# Patient Record
Sex: FEMALE | Race: WHITE | Employment: OTHER | URBAN - METROPOLITAN AREA
[De-identification: names, ages, dates, MRNs, and addresses within clinical notes are randomized per-mention and may not be internally consistent; named-entity substitution may affect disease eponyms.]

---

## 2023-09-01 ENCOUNTER — HOSPITAL ENCOUNTER (OUTPATIENT)
Age: 68
Setting detail: THERAPIES SERIES
Discharge: HOME | End: 2023-09-01
Payer: MEDICARE

## 2023-09-01 PROCEDURE — 97161 PT EVAL LOW COMPLEX 20 MIN: CPT

## 2023-09-01 NOTE — FLOWSHEET NOTE
Sander Fall Risk Assessment    Patient Name:  Bladimir Shelton  : 1955    Risk Factor Scale  Score   History of Falls [] Yes  [x] No 25  0    Secondary Diagnosis [] Yes  [x] No 15  0    Ambulatory Aid [] Furniture  [] Crutches/cane/walker  [x] None/bedrest/wheelchair/nurse 30  15  0    IV/Heparin Lock [] Yes  [x] No 20  0    Gait/Transferring [] Impaired  [] Weak  [x] Normal/bedrest/immobile 20  10  0    Mental Status [] Forgets limitations  [x] Oriented to own ability 15  0       Total:  0     Based on the Assessment score: check the appropriate box. [x]  No intervention needed   Low =   Score of 0-24    []  Use standard prevention interventions Moderate =  Score of 24-44   [] Give patient handout and discuss fall prevention strategies   [] Establish goal of education for patient/family RE: fall prevention strategies    []  Use high risk prevention interventions High = Score of 45 and higher   [] Give patient handout and discuss fall prevention strategies   [] Establish goal of education for patient/family Re: fall prevention strategies   [] Discuss lifeline / other resources    Electronically signed by:    Clifford Pickett, PT  Date: 2023

## 2023-09-01 NOTE — CONSULTS
Tests: [x] X-Ray: L4-S1 disc degeneration [] MRI:  [] Other:    Medications: [x] Refer to full medical record [] None [x] Other: diclofenac  Allergies:      [] Refer to full medical record  [] None [x] Other: PCN? Function:  Hand Dominance  [] Right  [] Left  Patient lives with:  spouse   In what type of home []  One story   [x] Two story   [] Split level   Number of stairs to enter  3   With handrail on the []  Right to enter   [x] Left to enter   Bathroom has a []  Tub only  [] Tub/shower combo   [] Walk in shower    []  Grab bars   Washing machine is on []  Main level   [] Second level   [x] Basement   Employer    Job Status []  Normal duty   [] Light duty   [] Off due to condition    [x]  Retired   [] Not employed   [] Disability  [] Other:  []  Return to work:    Work activities/duties  Works part-time as clinical instructor       ADL/IADL Previous level of function Current level of function Who currently assists the patient with task   Bathing  [] Independent  [] Assist [x] Independent  [] Assist    Dress/grooming [] Independent  [] Assist [x] Independent  [] Assist    Transfer/mobility [] Independent  [] Assist [x] Independent  [] Assist    Feeding [] Independent  [] Assist [x] Independent  [] Assist    Toileting [] Independent  [] Assist [x] Independent  [] Assist    Driving [] Independent  [] Assist [x] Independent  [] Assist    Housekeeping [] Independent  [] Assist [x] Independent  [] Assist    Grocery shop/meal prep [] Independent  [] Assist [x] Independent  [] Assist      Gait Prior level of function Current level of function    [] Independent  [] Assist [x] Independent  [] Assist   Device: [] Independent [x] Independent    [] Straight Cane [] Quad cane [] Straight Cane [] Quad cane    [] Standard walker [] Rolling walker   [] 4 wheeled walker [] Standard walker [] Rolling walker   [] 4 wheeled walker    [] Wheelchair [] Wheelchair     Pain:  [x] Yes  [] No Location: low back/right thigh  Pain

## 2023-09-05 ENCOUNTER — HOSPITAL ENCOUNTER (OUTPATIENT)
Age: 68
Setting detail: THERAPIES SERIES
Discharge: HOME | End: 2023-09-05
Payer: MEDICARE

## 2023-09-05 PROCEDURE — G0283 ELEC STIM OTHER THAN WOUND: HCPCS

## 2023-09-05 PROCEDURE — 97110 THERAPEUTIC EXERCISES: CPT

## 2023-09-05 NOTE — FLOWSHEET NOTE
[x] [address], [address] [place] [address]    Physical Therapy Daily Treatment Note      Date:  2023  Patient Name:  Sarah Hayward    :  1955  MRN: [id number]  Physician: Jose Ramon Comer MD                            [place] [place]  Medical Diagnosis: Right LBP                      Rehab Codes: M54.59  Onset Date: 3/1/23                 Next 's appt. : 6 months  Visit# / total visits:   Cancels/No Shows: 0/0    Subjective:    Pain:  [] Yes  [x] No Location:  Pain Rating: (0-10 scale) 0/10  Pain altered Tx:  [] No  [] Yes  Action:  Comments:  C/o continued LE pain down the leg especially at night. Tailbone gets sore when sitting and watching TV. Objective:  Modalities:   Precautions:  EExercises:  Exercise Reps/ Time Weight/ Level Comments   Prone lying 3 mins       LYDIA or pillows under chest 2 mins       Press ups *gentle 10x       Belted press ups  2x5       Prone lumbar traction  2'       Lumbar mobs (PA glides)  3'    Gr I/II lumbar             Sidelying nerve root flossing on right  15x                 Ham stretches bilat  3x15\"       Piriformis (if no sciatic symptoms)                                       Standing trunk extension 10x             Heat/TENS 4 pads S.I. prone 15'  Back setting   Other:    Specific Instructions for next treatment:  Monitor effect of today's visit and home program compliance. Advance program as able      Assessment: [] Progressing toward goals. [x] No change. No goals met yet. Did very well with all aspects of treatment today. Good response to manual treatment and modalities. No LE symptoms during or after visit.        [] Other:    [] Patient would continue to benefit from skilled physical therapy services in order to: Reduce pain, improve ROM/posture, and return to normal function including lifting, walking, sitting, standing, sleeping, traveling with minimal

## 2023-09-08 ENCOUNTER — HOSPITAL ENCOUNTER (OUTPATIENT)
Age: 68
Setting detail: THERAPIES SERIES
Discharge: HOME | End: 2023-09-08
Payer: MEDICARE

## 2023-09-08 PROCEDURE — G0283 ELEC STIM OTHER THAN WOUND: HCPCS

## 2023-09-08 PROCEDURE — 97110 THERAPEUTIC EXERCISES: CPT

## 2023-09-08 NOTE — FLOWSHEET NOTE
[x] [address]  532 [facility], [address] [place] [address]    Physical Therapy Daily Treatment Note      Date:  2023  Patient Name:  Latrell Diehl    :  1955  MRN: [id number]  Physician: Chavo Wilhelm MD                            Insurance: /[facility] [place]  Medical Diagnosis: Right LBP                      Rehab Codes: M54.59  Onset Date: 3/1/23                 Next 's appt. : 6 months  Visit# / total visits:   Cancels/No Shows: 0/0    Subjective:    Pain:  [] Yes  [x] No Location:  Pain Rating: (0-10 scale) 0/10  Pain altered Tx:  [] No  [] Yes  Action:  Comments:  Continued \"sciatic pain\" at night- able to sleep ~ 4 hours then unable to get comfortable after. States she had a bout of pain running into lateral leg during grocery shopping after last visit, but that resolved fairly quickly. Compliant with HEP at least 2x/day. Objective:  Modalities: TENS/Heat  Precautions:  EExercises:  Exercise Reps/ Time Weight/ Level Comments   Prone lying 3 mins       LYDIA or pillows under chest 2 mins       Press ups *gentle 10x       Belted press ups  2x5       Prone lumbar traction  2'       Lumbar mobs (PA glides)  3'    Gr I/II lumbar             Sidelying nerve root flossing on right  15x    with assist             Ham stretches bilat  3x15\"       Piriformis (if no sciatic symptoms)                                       Standing trunk extension 10x             Heat/TENS 4 pads S.I. prone 15'  continuous setting rather than Low back setting today   Other:    Specific Instructions for next treatment:  Monitor effect of today's visit and home program compliance. Advance program as able      Assessment: [] Progressing toward goals. [x] No change. No goals met yet. Did very well with all aspects of treatment today. Good response to manual treatment and modalities. No LE symptoms during or after visit.        [] Other:    [x] Patient would

## 2023-09-12 ENCOUNTER — HOSPITAL ENCOUNTER (OUTPATIENT)
Age: 68
Setting detail: THERAPIES SERIES
Discharge: HOME | End: 2023-09-12
Payer: MEDICARE

## 2023-09-12 PROCEDURE — 97110 THERAPEUTIC EXERCISES: CPT

## 2023-09-12 PROCEDURE — G0283 ELEC STIM OTHER THAN WOUND: HCPCS

## 2023-09-12 NOTE — FLOWSHEET NOTE
pain and monitor response. No LE symptoms during or after visit. [] Other:    [x] Patient would continue to benefit from skilled physical therapy services in order to: Reduce pain, improve ROM/posture, and return to normal function including lifting, walking, sitting, standing, sleeping, traveling with minimal pain/difficulty    STG: (to be met in 15 treatments)  ? Pain: to 0-2/10 centralized to low back only to improve sleep. ? ROM: Trunk to WNLs to improve sitting. ? Strength:  ? Function: Oswestry to 5/50. Patient to be independent with home exercise program as demonstrated by performance with correct form without cues. LTG: (to be met in 30 treatments)  Patient will return to normal function including lifting, walking, sitting, standing, sleeping, traveling with minimal pain/difficulty. Pt. Education:  [x] Yes  [] No  [x] Reviewed Prior HEP/Ed  Method of Education: [x] Verbal  [x] Demo  [] Written  Comprehension of Education:  [x] Verbalizes understanding. [] Demonstrates understanding. [x] Needs review. [] Demonstrates/verbalizes HEP/Ed previously given. Added ham stretches and nerve glides today. Plan: [x] Continue per plan of care. [] Other:      Treatment Charges: Mins Units   [x]  Modalities: TENS 15 1   [x]  Ther Exercise 25 2   [x]  Manual Therapy 5 0   []  Ther Activities     []  Aquatics     []  Neuromuscular     [] Vasocompression     [] Gait Training     [] Dry needling        [] 1 or 2 muscles        [] 3 or more muscles     []  Other     Total Treatment time 45 3     Time In: 11:46           Time Out: 12:36    Electronically signed by:   Abdullahi Multani PT

## 2023-09-14 ENCOUNTER — HOSPITAL ENCOUNTER (OUTPATIENT)
Age: 68
Setting detail: THERAPIES SERIES
Discharge: HOME | End: 2023-09-14
Payer: MEDICARE

## 2023-09-14 PROCEDURE — G0283 ELEC STIM OTHER THAN WOUND: HCPCS

## 2023-09-14 PROCEDURE — 97110 THERAPEUTIC EXERCISES: CPT

## 2023-09-14 NOTE — FLOWSHEET NOTE
toward goals. [x] No change. No goals met yet. Per subjective report pain intensity and frequency has increased. Tender over lumbar spine during mobs, although reports it's a \"good pain\". No changes in program at this point. She did note she felt pretty good after session. Educated on centralization of pain and possibility new ex's increased intensity for a bit, but also discussed if pain doesn't improve within reasonable amount of time- we will refer back to physician (patient states she was told to complete ~12 visits). [] Other:    [x] Patient would continue to benefit from skilled physical therapy services in order to: Reduce pain, improve ROM/posture, and return to normal function including lifting, walking, sitting, standing, sleeping, traveling with minimal pain/difficulty    STG: (to be met in 15 treatments)  ? Pain: to 0-2/10 centralized to low back only to improve sleep. ? ROM: Trunk to WNLs to improve sitting. ? Strength:  ? Function: Oswestry to 5/50. Patient to be independent with home exercise program as demonstrated by performance with correct form without cues. LTG: (to be met in 30 treatments)  Patient will return to normal function including lifting, walking, sitting, standing, sleeping, traveling with minimal pain/difficulty. Pt. Education:  [x] Yes  [] No  [x] Reviewed Prior HEP/Ed  Method of Education: [x] Verbal  [x] Demo  [] Written  Comprehension of Education:  [x] Verbalizes understanding. [] Demonstrates understanding. [x] Needs review. [] Demonstrates/verbalizes HEP/Ed previously given. Added ham stretches and nerve glides today. 9/14/23  suggested to patient to contact MD sooner than 6 months (next MD appt ) if no improvement in pain. Patient was told to complete 12 visits to see if pain diminishes  Plan: [x] Continue per plan of care.    [] Other:      Treatment Charges: Mins Units   [x]  Modalities: TENS 15 1   [x]  Ther Exercise 25 2   [x]  Manual Therapy

## 2023-09-19 ENCOUNTER — HOSPITAL ENCOUNTER (OUTPATIENT)
Age: 68
Setting detail: THERAPIES SERIES
Discharge: HOME | End: 2023-09-19
Payer: MEDICARE

## 2023-09-19 PROCEDURE — G0283 ELEC STIM OTHER THAN WOUND: HCPCS

## 2023-09-19 PROCEDURE — 97110 THERAPEUTIC EXERCISES: CPT

## 2023-09-19 NOTE — FLOWSHEET NOTE
[x] [address], [address] [place] [address]    Physical Therapy Daily Treatment Note      Date:  2023  Patient Name:  Arlen Parker    :  1955  MRN: [id number]  Physician: Geanie Soulier, MD                            Insurance: /[facility] [place]  Medical Diagnosis: Right LBP                      Rehab Codes: M54.59  Onset Date: 3/1/23                 Next 's appt. : 6 months  Visit# / total visits:   Cancels/No Shows: 0/0    Subjective:    Pain:  [] Yes  [x] No Location: LB only now  Pain Rating: (0-10 scale) 2/10  Pain altered Tx:  [] No  [x] Yes  Action: no new ex's added   Comments:  3-4/10 at worst in the past few days. Much less leg pain than last week. Was out of town and did all of the exercises except the ham stretches as she didn't have a strap. Wonders if it was irritating it. Objective:  Modalities: TENS/Heat  Precautions:  Exercises:  Exercise Reps/ Time Weight/ Level Comments   Prone lying 3 mins       Prone with hips shifted to the left 1' NP (no LE symptoms)  Added  for trial at home   LYDIA or pillows under chest 3 mins (LYDIA)       Press ups *gentle 10x       Belted press ups  2x5       Prone lumbar traction  2'       Lumbar mobs (PA glides)  4'    Gr I/II lumbar             Sidelying nerve root flossing on right  15x    with assist             Ham stretches bilat  4x15\"    standing today   Piriformis (if no sciatic symptoms)  3x15\"    added                                  Standing trunk extension 10x             Heat/TENS 4 pads S.I. prone 15'  continuous setting rather than Low back setting today   Other:    Specific Instructions for next treatment:  Monitor effect of today's visit and home program compliance. Advance program as able      Assessment: [x] Progressing toward goals. Per subjective report pain intensity and frequency has decreased lately as well as centralization of leg symptoms.

## 2023-09-21 ENCOUNTER — HOSPITAL ENCOUNTER (OUTPATIENT)
Age: 68
Setting detail: THERAPIES SERIES
Discharge: HOME | End: 2023-09-21
Payer: MEDICARE

## 2023-09-21 NOTE — FLOWSHEET NOTE
[x] [facility]) [facility]  [address] [place] [address] Andrea    [place], [address]        Physical Therapy Cancel/No Show note    Date: 2023  Patient: Kamlesh Jackson  : 1955  MRN: [id number]    Visit Count:   Cancels/No Shows to date:     For today's appointment patient:    [x]  Cancelled    [] Rescheduled appointment    [] No-show     Reason given by patient:    [x]  Patient ill    []  Conflicting appointment    [] No transportation      [] Conflict with work    [] No reason given    [] Weather related    [] [id number]    [] Other:      Comments:        [x] Next appointment was confirmed    Electronically signed by: Jamshid Darnell PTA

## 2023-09-25 ENCOUNTER — HOSPITAL ENCOUNTER (OUTPATIENT)
Age: 68
Setting detail: THERAPIES SERIES
Discharge: HOME | End: 2023-09-25
Payer: MEDICARE

## 2023-09-25 PROCEDURE — 97110 THERAPEUTIC EXERCISES: CPT

## 2023-09-25 PROCEDURE — 97140 MANUAL THERAPY 1/> REGIONS: CPT

## 2023-09-25 PROCEDURE — G0283 ELEC STIM OTHER THAN WOUND: HCPCS

## 2023-09-25 NOTE — FLOWSHEET NOTE
[x] [address]  532 [facility], [address] [place], [place] [address]     Physical Therapy Daily Treatment Note      Date:  2023  Patient Name:  Kendra Porter    :  1955  MRN: [id number]  Physician: Tati Gomez MD                            Insurance: /[facility] [place]  Medical Diagnosis: Right LBP                      Rehab Codes: M54.59  Onset Date: 3/1/23                 Next 's appt. : 6 months  Visit# / total visits:   Cancels/No Shows: 0/0    Subjective:    Pain:  [] Yes  [x] No Location: LB only now  Pain Rating: (0-10 scale) 2/10  Pain altered Tx:  [] No  [x] Yes  Action: no new ex's added   Comments:  continues to report decreased LE pain overall but can still get it at times. Objective:  Modalities: TENS/Heat  Precautions:  Exercises:  Exercise Reps/ Time Weight/ Level Comments   Prone lying 3 mins       Prone with hips shifted to the left 1' NP (no LE symptoms)  Added  for trial at home   LYDIA or pillows under chest 3 mins (LYDIA)       Press ups *gentle 10x       Belted press ups  2x5       Prone lumbar traction  2'       Lumbar mobs (PA glides); SI mobs  6    Gr I/II lumbar             Sidelying nerve root flossing on right  15x    with assist             Ham stretches bilat  4x15\"    standing today   Piriformis (if no sciatic symptoms)  4x15\"    added                                  Standing trunk extension 10x             Heat/TENS 4 pads S.I. prone 15'  continuous setting rather than Low back setting today   Other:    Specific Instructions for next treatment:  Monitor effect of today's visit and home program compliance. Advance program as able      Assessment: [x] Progressing toward goals. Continues to report improvement in symptoms overall, but still having them. Added SI mobilizations (tender but tolerable). Some tenderness with lumbar mobs at L3. Some incr back pain also after ham stretches. [] No change.

## 2023-09-28 ENCOUNTER — HOSPITAL ENCOUNTER (OUTPATIENT)
Age: 68
Setting detail: THERAPIES SERIES
Discharge: HOME | End: 2023-09-28
Payer: MEDICARE

## 2023-09-28 PROCEDURE — 97110 THERAPEUTIC EXERCISES: CPT

## 2023-09-28 PROCEDURE — 97140 MANUAL THERAPY 1/> REGIONS: CPT

## 2023-09-28 PROCEDURE — G0283 ELEC STIM OTHER THAN WOUND: HCPCS

## 2023-09-28 NOTE — FLOWSHEET NOTE
[x] [address], [address] [place] [address]    Physical Therapy Daily Treatment Note      Date:  2023  Patient Name:  Ale Marquez    :  1955  MRN: [id number]  Physician: Devon Abernathy MD                            Insurance: /[facility] [place]  Medical Diagnosis: Right LBP                      Rehab Codes: M54.59  Onset Date: 3/1/23                 Next 's appt. : 6 months  Visit# / total visits:   Cancels/No Shows: 0/0    Subjective:    Pain:  [] Yes  [x] No Location: LB only now  Pain Rating: (0-10 scale) 2/10  Pain altered Tx:  [] No  [x] Yes  Action: no new ex's added   Comments:  Patient reports pain seems to come and go- had a couple good days, thought she was doing much better- then past 2 nights have been very painful/ difficult to sleep (day time hasn't been too bad). She reports pain running down right LE still present at night especially. Objective:  Modalities: TENS/Heat  Precautions: none  Exercises:  Exercise Reps/ Time Weight/ Level Comments   Prone lying 3 mins       Prone with hips shifted to the left 1' NP (no LE symptoms)  Added  for trial at home   LYDIA or pillows under chest 3 mins (LYDIA)       Press ups *gentle 10x       Belted press ups  2x5       Prone lumbar traction  2'       Lumbar mobs (PA glides); SI mobs  6    Gr I/II lumbar             Sidelying nerve root flossing on right  15x    with assist             Ham stretches bilat  4x15\"    standing today   Piriformis (if no sciatic symptoms)  4x15\"    added     supine cane flexion          bridge          hip abd/add         Standing trunk extension 10x             Heat/TENS 4 pads S.I. prone 15'  continuous setting rather than Low back setting today   Other:    Specific Instructions for next treatment:  Monitor effect of today's visit and home program compliance. Advance program as able.  May add supine cane flex, hip abd/add, and bridging

## 2023-10-02 ENCOUNTER — HOSPITAL ENCOUNTER (OUTPATIENT)
Age: 68
Setting detail: THERAPIES SERIES
Discharge: HOME | End: 2023-10-02
Payer: MEDICARE

## 2023-10-02 PROCEDURE — 97140 MANUAL THERAPY 1/> REGIONS: CPT

## 2023-10-02 PROCEDURE — G0283 ELEC STIM OTHER THAN WOUND: HCPCS

## 2023-10-02 PROCEDURE — 97110 THERAPEUTIC EXERCISES: CPT

## 2023-10-02 NOTE — FLOWSHEET NOTE
[x] [address], [address] [place] [address]    Physical Therapy Daily Treatment Note      Date:  10/2/2023  Patient Name:  Cheryl Hernandez    :  1955  MRN: [id number]  Physician: Rosalina Quintana MD                            Insurance: /[facility] [place]  Medical Diagnosis: Right LBP                      Rehab Codes: M54.59  Onset Date: 3/1/23                 Next 's appt. : 6 months  Visit# / total visits:   Cancels/No Shows: 0/0    Subjective:    Pain:  [] Yes  [x] No Location: LB only now  Pain Rating: (0-10 scale) 2/10  Pain altered Tx:  [] No  [x] Yes  Action: no new ex's added   Comments:  Had quite a bit of a back \"ache\" after last session but had come in with one at the time. Pain up 3/10 at worst in the past few days. Tossing a lot at night still unable to find a comfortable position. Still getting sciatic symptoms but much less often. Objective:  Modalities: TENS/Heat  Precautions: none  Exercises:  Exercise Reps/ Time Weight/ Level Comments   Prone lying 3 mins       Prone with hips shifted to the left 1' NP (no LE symptoms)  Added  for trial at home   LYDIA or pillows under chest 3 mins (LYDIA)       Press ups *gentle 10x       Belted press ups  2x5       Prone hip ext 5x ea. Added 10/   Prone lumbar traction  2'       Lumbar mobs (PA glides); SI mobs  4'    Gr I/II lumbar             Sidelying nerve root flossing on right  15x    with assist             Ham stretches bilat  4x15\"    standing today   Piriformis (if no sciatic symptoms)  4x15\"    added     supine cane flexion          bridge          hip abd/add         Standing trunk extension 10x             Heat/TENS 4 pads S.I. prone 15'  continuous setting rather than Low back setting today   Other:    Specific Instructions for next treatment:  Monitor effect of today's visit and home program compliance. Advance program as able.  May add supine cane flex, hip

## 2023-10-05 ENCOUNTER — HOSPITAL ENCOUNTER (OUTPATIENT)
Age: 68
Setting detail: THERAPIES SERIES
Discharge: HOME | End: 2023-10-05
Payer: MEDICARE

## 2023-10-05 PROCEDURE — 97110 THERAPEUTIC EXERCISES: CPT

## 2023-10-05 PROCEDURE — G0283 ELEC STIM OTHER THAN WOUND: HCPCS

## 2023-10-05 PROCEDURE — 97140 MANUAL THERAPY 1/> REGIONS: CPT

## 2023-10-05 NOTE — FLOWSHEET NOTE
[x] [address]  532 [facility], [address] [place] [address]    Physical Therapy Daily Treatment Note      Date:  10/5/2023  Patient Name:  Isabel Walker    :  1955  MRN: [id number]  Physician: Presley De La Torre MD                            Insurance: /[facility] [place] [place]  Medical Diagnosis: Right LBP                      Rehab Codes: M54.59  Onset Date: 3/1/23                 Next 's appt. : 6 months  Visit# / total visits: 10/30  Cancels/No Shows: 0/0    Subjective:    Pain:  [] Yes  [x] No Location: LB only now  Pain Rating: (0-10 scale) 2/10  Pain altered Tx:  [] No  [x] Yes  Action: no new ex's added   Comments:  Patient reports her biggest complaint currently is yolanda hip pain (she believes is bursitis) at night trying to sleep. Sciatic pain is no longer bothering her much     Objective:  Modalities: TENS/Heat  Precautions: none  Exercises:  Exercise Reps/ Time Weight/ Level Comments   Prone lying 3 mins       Prone with hips shifted to the left 1' NP (no LE symptoms)  Added  for trial at home   LYDIA or pillows under chest 3 mins (LYDIA)       Press ups *gentle 10x       Belted press ups  2x5       Prone hip ext 5x ea. Added 10/2   Prone lumbar traction  2'       Lumbar mobs (SADE mattson); SI mobs  4'    Gr I/II lumbar             Sidelying nerve root flossing on right  15x    with assist             Ham stretches bilat  4x15\"    standing today   Piriformis (if no sciatic symptoms)  4x15\"    added    Supine marching x10  Added 10/5    supine cane flexion  x15  3# wand      bridge  x10    added 10/5    hip abd/add  x10   green/ ball  added 10/5   Standing trunk extension 10x             Heat/TENS 4 pads S.I. prone 15'  continuous setting rather than Low back setting today   Other:    Specific Instructions for next treatment:  Monitor effect of today's visit/ newly added ex's. Advance core strength as able      Assessment: [x] Progressing toward goals.

## 2023-10-09 ENCOUNTER — HOSPITAL ENCOUNTER (OUTPATIENT)
Age: 68
Setting detail: THERAPIES SERIES
Discharge: HOME | End: 2023-10-09
Payer: MEDICARE

## 2023-10-09 PROCEDURE — 97110 THERAPEUTIC EXERCISES: CPT

## 2023-10-09 PROCEDURE — G0283 ELEC STIM OTHER THAN WOUND: HCPCS

## 2023-10-09 PROCEDURE — 97140 MANUAL THERAPY 1/> REGIONS: CPT

## 2023-10-09 NOTE — PROGRESS NOTES
[x] [address]  532 [facility], [address] [place] [address]    Physical Therapy Daily Treatment Note/ Progress note      Date:  10/9/2023  Patient Name:  Dede Prado    :  1955  MRN: [id number]  Physician: Jadyn Winters MD                            [place] [place]  Medical Diagnosis: Right LBP                      Rehab Codes: M54.59  Onset Date: 3/1/23                 Next 's appt. : 6 months  Visit# / total visits:   Cancels/No Shows: 0/0    Subjective:    Pain:  [] Yes  [x] No Location: LB only now  Pain Rating: (0-10 scale) 2/10  Pain altered Tx:  [] No  [x] Yes  Action: no new ex's added   Comments:  Patient reports she has not had sciatic pain since last session. Vinay hips still uncomfortable at night/ with prolonged sitting. Objective:  Modalities: TENS/Heat  Precautions: none  Exercises:  Exercise Reps/ Time Weight/ Level Comments   Prone lying 3 mins       Prone with hips shifted to the left 1' NP (no LE symptoms)  Added  for trial at home   LYDIA  3 mins       Press ups *gentle 10x       Belted press ups  2x5       Prone hip ext 10x ea. Added 10/2   Prone lumbar traction  2' NP       Lumbar mobs (SADE mattson); SI mobs  4'    Gr I/II lumbar             Sidelying nerve root flossing on right  15x NP    with assist             Ham stretches bilat  4x15\"    standing today   Piriformis (if no sciatic symptoms)  4x15\"    added    Supine marching x10 Added alt UE today Added 10/5    supine cane flexion  x15  3# wand      bridge  x15    added 10/5    hip abd/add  x15  green/ ball  added 10/5   Standing trunk extension 10x             Heat/TENS 4 pads S.I. prone 15'  continuous setting rather than Low back setting today   Other:    Specific Instructions for next treatment: Plan to hold further therapy for a couple weeks while completing PT on her own. Assessment: [] Progressing toward goals. [] No change.          [x]

## 2023-10-10 PROBLEM — R00.0 TACHYCARDIA: Status: ACTIVE | Noted: 2020-11-11

## 2023-10-10 PROBLEM — K90.0 CELIAC DISEASE: Status: ACTIVE | Noted: 2017-03-16

## 2023-10-10 PROBLEM — M19.90 OSTEOARTHRITIS: Status: ACTIVE | Noted: 2023-10-10

## 2023-10-10 PROBLEM — R00.2 PALPITATIONS: Status: ACTIVE | Noted: 2020-11-11

## 2023-10-10 PROBLEM — M51.369 LUMBAR DEGENERATIVE DISC DISEASE: Status: ACTIVE | Noted: 2017-10-30

## 2023-10-10 PROBLEM — M53.3 SACROILIAC PAIN: Status: ACTIVE | Noted: 2017-10-30

## 2023-10-10 PROBLEM — M51.36 LUMBAR DEGENERATIVE DISC DISEASE: Status: ACTIVE | Noted: 2017-10-30

## 2023-10-10 RX ORDER — TIZANIDINE 4 MG/1
TABLET ORAL
COMMUNITY
Start: 2022-06-27 | End: 2023-10-12

## 2023-10-10 RX ORDER — ROSUVASTATIN CALCIUM 10 MG/1
TABLET, COATED ORAL
COMMUNITY

## 2023-10-10 RX ORDER — IBUPROFEN 200 MG
200 TABLET ORAL EVERY 6 HOURS PRN
COMMUNITY
End: 2023-10-12

## 2023-10-10 RX ORDER — CYCLOBENZAPRINE HCL 10 MG
TABLET ORAL
COMMUNITY
Start: 2022-10-28

## 2023-10-10 RX ORDER — ALPRAZOLAM 0.25 MG/1
TABLET ORAL
COMMUNITY
Start: 2022-05-31

## 2023-10-10 RX ORDER — KETOROLAC TROMETHAMINE 10 MG/1
TABLET, FILM COATED ORAL
COMMUNITY
Start: 2022-04-19 | End: 2023-10-12

## 2023-10-10 RX ORDER — DICLOFENAC SODIUM 75 MG/1
TABLET, DELAYED RELEASE ORAL
COMMUNITY
Start: 2022-06-27

## 2023-10-10 RX ORDER — LEVOTHYROXINE SODIUM 0.1 MG/1
TABLET ORAL
COMMUNITY

## 2023-10-10 RX ORDER — ZOLPIDEM TARTRATE 10 MG/1
TABLET ORAL
COMMUNITY
Start: 2023-08-24

## 2023-10-10 RX ORDER — ESTRADIOL 10 UG/1
INSERT VAGINAL
COMMUNITY

## 2023-10-12 ENCOUNTER — OFFICE VISIT (OUTPATIENT)
Age: 68
End: 2023-10-12

## 2023-10-12 VITALS
WEIGHT: 146.8 LBS | BODY MASS INDEX: 26.01 KG/M2 | HEART RATE: 74 BPM | SYSTOLIC BLOOD PRESSURE: 126 MMHG | RESPIRATION RATE: 14 BRPM | DIASTOLIC BLOOD PRESSURE: 80 MMHG | TEMPERATURE: 97.8 F | HEIGHT: 63 IN

## 2023-10-12 DIAGNOSIS — E78.2 MIXED HYPERLIPIDEMIA: Primary | ICD-10-CM

## 2023-10-12 DIAGNOSIS — M79.7 FIBROMYALGIA: ICD-10-CM

## 2023-10-12 DIAGNOSIS — G47.00 INSOMNIA, UNSPECIFIED TYPE: ICD-10-CM

## 2023-10-12 DIAGNOSIS — M15.9 PRIMARY OSTEOARTHRITIS INVOLVING MULTIPLE JOINTS: ICD-10-CM

## 2023-10-12 DIAGNOSIS — E03.9 HYPOTHYROIDISM (ACQUIRED): ICD-10-CM

## 2023-10-12 DIAGNOSIS — K90.0 CELIAC DISEASE: ICD-10-CM

## 2023-10-12 NOTE — PROGRESS NOTES
Lymphocytes Absolute 08/28/2023 1.29     Monocytes Absolute 08/28/2023 0.62     Eosinophils Absolute 08/28/2023 0.10     Basophils Absolute 08/28/2023 0.07     IMMATURE GRANULOCYTES AB* 08/28/2023 0.02         ASSESSMENT/PLAN     1. Mixed hyperlipidemia  Comments:  Continue Crestor. LDL 98 2/23  Orders:  -     CBC with Auto Differential; Future  -     Comprehensive Metabolic Panel; Future  -     Lipid Panel; Future  2. Primary osteoarthritis involving multiple joints  Comments:  stable, careful use of voltaren with food  3. Hypothyroidism (acquired)  Comments:  Continue Levothyroxine. at goal 2/23  Orders:  -     TSH; Future  -     T4, Free; Future  4. Insomnia, unspecified type  Comments:  Continue Ambien. discussed careful use  5. Fibromyalgia  Comments:  [facility]  6. Celiac disease  Comments:  stable on gluten free diet       P20 done 10/22    Return in about 6 months (around 4/12/2024). Disposition and Communication: Radha Houser (scribe), documented on behalf of Raven Almeida D.O.      Electronically signed by Gian Mcmahon DO on 10/12/2023 at 6:52 PM

## 2023-10-13 ENCOUNTER — APPOINTMENT (OUTPATIENT)
Age: 68
End: 2023-10-13
Payer: MEDICARE

## 2024-02-20 DIAGNOSIS — G47.00 INSOMNIA, UNSPECIFIED TYPE: Primary | ICD-10-CM

## 2024-02-20 RX ORDER — ZOLPIDEM TARTRATE 10 MG/1
TABLET ORAL
Qty: 90 TABLET | Refills: 0 | Status: SHIPPED | OUTPATIENT
Start: 2024-02-20 | End: 2024-04-20

## 2024-02-20 NOTE — TELEPHONE ENCOUNTER
April Brooks is calling to request a refill on the following medication(s):    Medication Request:  Requested Prescriptions     Pending Prescriptions Disp Refills    zolpidem (AMBIEN) 10 MG tablet       Si tablet at bedtime Orally DAILY for 90 days       Last Visit Date (If Applicable):  10/12/2023    Next Visit Date:    4/15/2024

## 2024-03-12 ENCOUNTER — TELEPHONE (OUTPATIENT)
Age: 69
End: 2024-03-12

## 2024-03-12 NOTE — TELEPHONE ENCOUNTER
I sent in [employer] form for patients Zolpidem to try and get it approved, form is scanned in to patients media, awaiting response

## 2024-04-03 LAB
ALBUMIN SERPL-MCNC: 3.7 G/DL
ALP BLD-CCNC: 58 U/L
ALT SERPL-CCNC: 30 U/L
ANION GAP SERPL CALCULATED.3IONS-SCNC: NORMAL MMOL/L
AST SERPL-CCNC: 31 U/L
BASOPHILS ABSOLUTE: NORMAL
BASOPHILS RELATIVE PERCENT: NORMAL
BILIRUB SERPL-MCNC: 0.3 MG/DL (ref 0.1–1.4)
BUN BLDV-MCNC: 22 MG/DL
CALCIUM SERPL-MCNC: 9.2 MG/DL
CHLORIDE BLD-SCNC: 106 MMOL/L
CHOLESTEROL, TOTAL: 153 MG/DL
CHOLESTEROL/HDL RATIO: 2.7
CO2: 27 MMOL/L
CREAT SERPL-MCNC: 0.89 MG/DL
EGFR: NORMAL
EOSINOPHILS ABSOLUTE: NORMAL
EOSINOPHILS RELATIVE PERCENT: NORMAL
GLUCOSE BLD-MCNC: 84 MG/DL
HCT VFR BLD CALC: 38.4 % (ref 36–46)
HDLC SERPL-MCNC: 56 MG/DL (ref 35–70)
HEMOGLOBIN: 12.7 G/DL (ref 12–16)
LDL CHOLESTEROL CALCULATED: 81 MG/DL (ref 0–160)
LYMPHOCYTES ABSOLUTE: NORMAL
LYMPHOCYTES RELATIVE PERCENT: NORMAL
MCH RBC QN AUTO: 31.9 PG
MCHC RBC AUTO-ENTMCNC: 33.1 G/DL
MCV RBC AUTO: 96.5 FL
MONOCYTES ABSOLUTE: NORMAL
MONOCYTES RELATIVE PERCENT: NORMAL
NEUTROPHILS ABSOLUTE: NORMAL
NEUTROPHILS RELATIVE PERCENT: NORMAL
NONHDLC SERPL-MCNC: NORMAL MG/DL
PDW BLD-RTO: 49.8 %
PLATELET # BLD: 272 K/ΜL
PMV BLD AUTO: NORMAL FL
POTASSIUM SERPL-SCNC: 4 MMOL/L
RBC # BLD: 3.98 10^6/ΜL
SODIUM BLD-SCNC: 140 MMOL/L
T4 FREE: 1.99
TOTAL PROTEIN: 6.1
TRIGL SERPL-MCNC: 81 MG/DL
TSH SERPL DL<=0.05 MIU/L-ACNC: 0.37 UIU/ML
VLDLC SERPL CALC-MCNC: 16 MG/DL
WBC # BLD: 8.22 10^3/ML

## 2024-04-04 DIAGNOSIS — E78.2 MIXED HYPERLIPIDEMIA: ICD-10-CM

## 2024-04-04 DIAGNOSIS — E03.9 HYPOTHYROIDISM (ACQUIRED): ICD-10-CM

## 2024-04-12 SDOH — ECONOMIC STABILITY: FOOD INSECURITY: WITHIN THE PAST 12 MONTHS, YOU WORRIED THAT YOUR FOOD WOULD RUN OUT BEFORE YOU GOT MONEY TO BUY MORE.: NEVER TRUE

## 2024-04-12 SDOH — ECONOMIC STABILITY: HOUSING INSECURITY
IN THE LAST 12 MONTHS, WAS THERE A TIME WHEN YOU DID NOT HAVE A STEADY PLACE TO SLEEP OR SLEPT IN A SHELTER (INCLUDING NOW)?: NO

## 2024-04-12 SDOH — ECONOMIC STABILITY: FOOD INSECURITY: WITHIN THE PAST 12 MONTHS, THE FOOD YOU BOUGHT JUST DIDN'T LAST AND YOU DIDN'T HAVE MONEY TO GET MORE.: NEVER TRUE

## 2024-04-12 SDOH — ECONOMIC STABILITY: TRANSPORTATION INSECURITY
IN THE PAST 12 MONTHS, HAS LACK OF TRANSPORTATION KEPT YOU FROM MEETINGS, WORK, OR FROM GETTING THINGS NEEDED FOR DAILY LIVING?: NO

## 2024-04-12 SDOH — ECONOMIC STABILITY: INCOME INSECURITY: HOW HARD IS IT FOR YOU TO PAY FOR THE VERY BASICS LIKE FOOD, HOUSING, MEDICAL CARE, AND HEATING?: NOT HARD AT ALL

## 2024-04-15 ENCOUNTER — OFFICE VISIT (OUTPATIENT)
Age: 69
End: 2024-04-15
Payer: MEDICARE

## 2024-04-15 VITALS
SYSTOLIC BLOOD PRESSURE: 128 MMHG | WEIGHT: 147 LBS | RESPIRATION RATE: 12 BRPM | HEART RATE: 62 BPM | OXYGEN SATURATION: 98 % | BODY MASS INDEX: 26.05 KG/M2 | HEIGHT: 63 IN | DIASTOLIC BLOOD PRESSURE: 72 MMHG

## 2024-04-15 DIAGNOSIS — E03.9 HYPOTHYROIDISM (ACQUIRED): ICD-10-CM

## 2024-04-15 DIAGNOSIS — M15.9 PRIMARY OSTEOARTHRITIS INVOLVING MULTIPLE JOINTS: ICD-10-CM

## 2024-04-15 DIAGNOSIS — E78.2 MIXED HYPERLIPIDEMIA: Primary | ICD-10-CM

## 2024-04-15 DIAGNOSIS — G47.00 INSOMNIA, UNSPECIFIED TYPE: ICD-10-CM

## 2024-04-15 DIAGNOSIS — K90.0 CELIAC DISEASE: ICD-10-CM

## 2024-04-15 DIAGNOSIS — Z00.00 MEDICARE ANNUAL WELLNESS VISIT, SUBSEQUENT: ICD-10-CM

## 2024-04-15 PROCEDURE — 1090F PRES/ABSN URINE INCON ASSESS: CPT | Performed by: FAMILY MEDICINE

## 2024-04-15 PROCEDURE — 1036F TOBACCO NON-USER: CPT | Performed by: FAMILY MEDICINE

## 2024-04-15 PROCEDURE — G8400 PT W/DXA NO RESULTS DOC: HCPCS | Performed by: FAMILY MEDICINE

## 2024-04-15 PROCEDURE — 1123F ACP DISCUSS/DSCN MKR DOCD: CPT | Performed by: FAMILY MEDICINE

## 2024-04-15 PROCEDURE — 3017F COLORECTAL CA SCREEN DOC REV: CPT | Performed by: FAMILY MEDICINE

## 2024-04-15 PROCEDURE — G8427 DOCREV CUR MEDS BY ELIG CLIN: HCPCS | Performed by: FAMILY MEDICINE

## 2024-04-15 PROCEDURE — G0439 PPPS, SUBSEQ VISIT: HCPCS | Performed by: FAMILY MEDICINE

## 2024-04-15 PROCEDURE — 99214 OFFICE O/P EST MOD 30 MIN: CPT | Performed by: FAMILY MEDICINE

## 2024-04-15 PROCEDURE — G8419 CALC BMI OUT NRM PARAM NOF/U: HCPCS | Performed by: FAMILY MEDICINE

## 2024-04-15 RX ORDER — TRIAMCINOLONE ACETONIDE 1 MG/G
CREAM TOPICAL
COMMUNITY
Start: 2024-03-15

## 2024-04-15 ASSESSMENT — PATIENT HEALTH QUESTIONNAIRE - PHQ9
SUM OF ALL RESPONSES TO PHQ QUESTIONS 1-9: 0
2. FEELING DOWN, DEPRESSED OR HOPELESS: NOT AT ALL

## 2024-04-15 NOTE — PROGRESS NOTES
for back pain, neck pain and neck stiffness.   Skin:  Negative for rash and wound.   Neurological:  Negative for syncope, weakness, light-headedness and headaches.   Hematological:  Negative for adenopathy. Does not bruise/bleed easily.   Psychiatric/Behavioral:  Negative for suicidal ideas. The patient is not nervous/anxious.      REVIEWED INFORMATION      Allergies   Allergen Reactions    Penicillins Other (See Comments)       Current Outpatient Medications   Medication Sig Dispense Refill    triamcinolone (KENALOG) 0.1 % cream       Diclofenac Sodium (VOLTAREN PO) Take 75 mg by mouth daily      metroNIDAZOLE (METROCREAM) 0.75 % cream Apply topically 2 times daily Apply topically 2 times daily.      zolpidem (AMBIEN) 10 MG tablet 1 tablet at bedtime Orally DAILY for 90 days 90 tablet 0    ALPRAZolam (XANAX) 0.25 MG tablet 1 tablet Orally daily prn for 30 days      vitamin D (CHOLECALCIFEROL) 50 MCG (2000 UT) CAPS capsule Take 1 capsule by mouth daily      cyclobenzaprine (FLEXERIL) 10 MG tablet 1 tablet at bedtime as needed Orally Once a day for 30 days      levothyroxine (SYNTHROID) 100 MCG tablet TAKE ONE TABLET BY MOUTH DAILY for 90      rosuvastatin (CRESTOR) 10 MG tablet TAKE ONE TABLET BY MOUTH DAILY for 90       No current facility-administered medications for this visit.        Patient Active Problem List   Diagnosis    Celiac disease    Lumbar degenerative disc disease    Osteoarthritis    Palpitations    Sacroiliac pain    Tachycardia       Past Medical History:   Diagnosis Date    Arthritis     right hand    Bursitis     Celiac disease     Fractured coccyx (HCC)     2012    Functional murmur     Hearing loss     History of anemia     History of celiac disease     History of Graves' disease     History of mumps     History of urinary calculi     History of varicella     Hypothyroidism     Kidney stone     Rosacea     Tendonitis     Thyroid disorder        Past Surgical History:   Procedure Laterality

## 2024-07-15 DIAGNOSIS — G47.00 INSOMNIA, UNSPECIFIED TYPE: ICD-10-CM

## 2024-07-15 RX ORDER — ZOLPIDEM TARTRATE 10 MG/1
TABLET ORAL
Qty: 90 TABLET | Refills: 0 | Status: SHIPPED | OUTPATIENT
Start: 2024-07-15 | End: 2024-10-15

## 2024-07-15 NOTE — TELEPHONE ENCOUNTER
April Brooks is calling to request a refill on the following medication(s):    Medication Request:  Requested Prescriptions     Pending Prescriptions Disp Refills    zolpidem (AMBIEN) 10 MG tablet [Pharmacy Med Name: ZOLPIDEM TARTRATE 10 MG TABLET] 30 tablet      Sig: TAKE 1 TABLET BY MOUTH EVERY NIGHT AT BEDTIME       Last Visit Date (If Applicable):  4/15/2024    Next Visit Date:    10/21/2024

## 2024-10-14 LAB
BASOPHILS ABSOLUTE: 0.1 /ΜL
BASOPHILS RELATIVE PERCENT: 1.8 %
EOSINOPHILS ABSOLUTE: 0.26 /ΜL
EOSINOPHILS RELATIVE PERCENT: 4.6 %
HCT VFR BLD CALC: 39 % (ref 36–46)
HEMOGLOBIN: 12.9 G/DL (ref 12–16)
LYMPHOCYTES ABSOLUTE: 1.29 /ΜL
LYMPHOCYTES RELATIVE PERCENT: 22.9 %
MCH RBC QN AUTO: 31.9 PG
MCHC RBC AUTO-ENTMCNC: 33.1 G/DL
MCV RBC AUTO: 96.3 FL
MONOCYTES ABSOLUTE: 0.48 /ΜL
MONOCYTES RELATIVE PERCENT: 8.5 %
NEUTROPHILS ABSOLUTE: 3.5 /ΜL
NEUTROPHILS RELATIVE PERCENT: 62 %
PDW BLD-RTO: 48.6 %
PLATELET # BLD: 313 K/ΜL
PMV BLD AUTO: NORMAL FL
RBC # BLD: 4.05 10^6/ΜL
WBC # BLD: 5.64 10^3/ML

## 2024-10-17 DIAGNOSIS — E78.2 MIXED HYPERLIPIDEMIA: ICD-10-CM

## 2024-10-21 ENCOUNTER — HOSPITAL ENCOUNTER (OUTPATIENT)
Age: 69
Setting detail: SPECIMEN
Discharge: HOME | End: 2024-10-21

## 2024-10-21 ENCOUNTER — OFFICE VISIT (OUTPATIENT)
Age: 69
End: 2024-10-21

## 2024-10-21 VITALS
SYSTOLIC BLOOD PRESSURE: 134 MMHG | HEART RATE: 62 BPM | DIASTOLIC BLOOD PRESSURE: 70 MMHG | BODY MASS INDEX: 27.32 KG/M2 | HEIGHT: 63 IN | TEMPERATURE: 97.8 F | OXYGEN SATURATION: 99 % | WEIGHT: 154.2 LBS

## 2024-10-21 DIAGNOSIS — K90.0 CELIAC DISEASE: ICD-10-CM

## 2024-10-21 DIAGNOSIS — M15.0 PRIMARY OSTEOARTHRITIS INVOLVING MULTIPLE JOINTS: ICD-10-CM

## 2024-10-21 DIAGNOSIS — E03.9 HYPOTHYROIDISM (ACQUIRED): ICD-10-CM

## 2024-10-21 DIAGNOSIS — R35.0 URINARY FREQUENCY: ICD-10-CM

## 2024-10-21 DIAGNOSIS — E78.2 MIXED HYPERLIPIDEMIA: ICD-10-CM

## 2024-10-21 DIAGNOSIS — E78.2 MIXED HYPERLIPIDEMIA: Primary | ICD-10-CM

## 2024-10-21 DIAGNOSIS — G47.00 INSOMNIA, UNSPECIFIED TYPE: ICD-10-CM

## 2024-10-21 DIAGNOSIS — M46.1 SACROILIITIS (HCC): ICD-10-CM

## 2024-10-21 DIAGNOSIS — F41.9 ANXIETY: ICD-10-CM

## 2024-10-21 DIAGNOSIS — Z23 IMMUNIZATION DUE: ICD-10-CM

## 2024-10-21 LAB
ALBUMIN SERPL-MCNC: 4.3 G/DL (ref 3.5–5.2)
ALBUMIN/GLOB SERPL: 2 {RATIO} (ref 1–2.5)
ALP SERPL-CCNC: 58 U/L (ref 35–104)
ALT SERPL-CCNC: 18 U/L (ref 10–35)
ANION GAP SERPL CALCULATED.3IONS-SCNC: 9 MMOL/L (ref 9–16)
AST SERPL-CCNC: 25 U/L (ref 10–35)
BILIRUB SERPL-MCNC: 0.4 MG/DL (ref 0–1.2)
BILIRUB UR QL STRIP: NEGATIVE
BUN SERPL-MCNC: 21 MG/DL (ref 8–23)
CALCIUM SERPL-MCNC: 9.9 MG/DL (ref 8.6–10.4)
CHLORIDE SERPL-SCNC: 104 MMOL/L (ref 98–107)
CLARITY UR: CLEAR
CO2 SERPL-SCNC: 26 MMOL/L (ref 20–31)
COLOR UR: YELLOW
COMMENT: NORMAL
CREAT SERPL-MCNC: 0.9 MG/DL (ref 0.5–0.9)
GFR, ESTIMATED: 72 ML/MIN/1.73M2
GLUCOSE SERPL-MCNC: 76 MG/DL (ref 74–99)
GLUCOSE UR STRIP-MCNC: NEGATIVE MG/DL
HGB UR QL STRIP.AUTO: NEGATIVE
KETONES UR STRIP-MCNC: NEGATIVE MG/DL
LEUKOCYTE ESTERASE UR QL STRIP: NEGATIVE
NITRITE UR QL STRIP: NEGATIVE
PH UR STRIP: 5.5 [PH] (ref 5–8)
POTASSIUM SERPL-SCNC: 4.1 MMOL/L (ref 3.7–5.3)
PROT SERPL-MCNC: 6.8 G/DL (ref 6.6–8.7)
PROT UR STRIP-MCNC: NEGATIVE MG/DL
SODIUM SERPL-SCNC: 139 MMOL/L (ref 136–145)
SP GR UR STRIP: 1.01 (ref 1–1.03)
UROBILINOGEN UR STRIP-ACNC: NORMAL EU/DL (ref 0–1)

## 2024-10-21 RX ORDER — ZOLPIDEM TARTRATE 10 MG/1
10 TABLET ORAL NIGHTLY
Qty: 90 TABLET | Refills: 0 | Status: SHIPPED | OUTPATIENT
Start: 2024-10-21 | End: 2025-01-19

## 2024-10-21 RX ORDER — LIDOCAINE 50 MG/G
1 PATCH TOPICAL DAILY
Qty: 30 PATCH | Refills: 0 | Status: SHIPPED | OUTPATIENT
Start: 2024-10-21 | End: 2024-11-20

## 2024-10-21 RX ORDER — BIOTIN 1000 MCG
TABLET,CHEWABLE ORAL
COMMUNITY

## 2024-10-21 RX ORDER — ZOLPIDEM TARTRATE 10 MG/1
TABLET ORAL NIGHTLY
COMMUNITY
End: 2024-10-21 | Stop reason: SDUPTHER

## 2024-10-21 RX ORDER — ALPRAZOLAM 0.25 MG/1
0.25 TABLET ORAL DAILY PRN
Qty: 30 TABLET | Refills: 0 | Status: SHIPPED | OUTPATIENT
Start: 2024-10-21 | End: 2024-11-20

## 2024-10-21 NOTE — PROGRESS NOTES
Vaccine Information Sheet, \"Influenza - Inactivated\"  given to April Brooks, or parent/legal guardian of  April Brooks and verbalized understanding.    Patient responses:    Have you ever had a reaction to a flu vaccine? No  Are you able to eat eggs without adverse effects?  Yes  Do you have any current illness?  No  Have you ever had Guillian [place] Syndrome?  No    Flu vaccine given per order. Please see immunization tab.    Left deltoid.                
SYSTEM      Review of Systems:   Constitutional:  Negative for chills, fatigue, fever and unexpected weight change.   Eyes:  Negative for visual disturbance.   Respiratory:  Negative for cough, chest tightness, shortness of breath and wheezing.    Cardiovascular:  Negative for chest pain, palpitations and leg swelling.   Gastrointestinal:  Negative for abdominal distention, abdominal pain, blood in stool, constipation, diarrhea, nausea and vomiting.   Genitourinary:  Negative for dysuria, hematuria and urgency.   Musculoskeletal:  Negative for back pain, neck pain and neck stiffness.   Skin:  Negative for rash and wound.   Neurological:  Negative for syncope, weakness, light-headedness and headaches.   Hematological:  Negative for adenopathy. Does not bruise/bleed easily.   Psychiatric/Behavioral:  Negative for suicidal ideas. The patient is not nervous/anxious.      REVIEWED INFORMATION      Allergies   Allergen Reactions    Penicillins Other (See Comments)       Current Outpatient Medications   Medication Sig Dispense Refill    Biotin 1000 MCG CHEW Take by mouth Unsure of dose      zolpidem (AMBIEN) 10 MG tablet Take 1 tablet by mouth at bedtime for 90 days. Max Daily Amount: 10 mg 90 tablet 0    ALPRAZolam (XANAX) 0.25 MG tablet Take 1 tablet by mouth daily as needed for Anxiety for up to 30 days. Max Daily Amount: 0.25 mg 30 tablet 0    lidocaine (LIDODERM) 5 % Place 1 patch onto the skin daily 12 hours on, 12 hours off. 30 patch 0    triamcinolone (KENALOG) 0.1 % cream       Diclofenac Sodium (VOLTAREN PO) Take 75 mg by mouth daily      metroNIDAZOLE (METROCREAM) 0.75 % cream Apply topically 2 times daily Apply topically 2 times daily.      vitamin D (CHOLECALCIFEROL) 50 MCG (2000 UT) CAPS capsule Take 1 capsule by mouth 2 times daily      cyclobenzaprine (FLEXERIL) 10 MG tablet 1 tablet at bedtime as needed Orally Once a day for 30 days      levothyroxine (SYNTHROID) 100 MCG tablet TAKE ONE TABLET BY MOUTH

## 2024-10-23 LAB
MICROORGANISM SPEC CULT: ABNORMAL
SERVICE CMNT-IMP: ABNORMAL
SPECIMEN DESCRIPTION: ABNORMAL

## 2024-10-23 RX ORDER — CEPHALEXIN 500 MG/1
500 CAPSULE ORAL 3 TIMES DAILY
Qty: 21 CAPSULE | Refills: 0 | Status: SHIPPED | OUTPATIENT
Start: 2024-10-23 | End: 2024-10-30

## 2024-11-21 RX ORDER — LEVOTHYROXINE SODIUM 100 UG/1
100 TABLET ORAL DAILY
Qty: 90 TABLET | Refills: 1 | Status: SHIPPED | OUTPATIENT
Start: 2024-11-21

## 2024-11-21 NOTE — TELEPHONE ENCOUNTER
April Brooks is calling to request a refill on the following medication(s):    Medication Request:  Requested Prescriptions     Pending Prescriptions Disp Refills    levothyroxine (SYNTHROID) 100 MCG tablet [Pharmacy Med Name: LEVOTHYROXINE 100 MCG TABLET] 90 tablet      Sig: TAKE 1 TABLET BY MOUTH DAILY       Last Visit Date (If Applicable):  10/21/2024    Next Visit Date:    4/21/2025

## 2024-12-17 ENCOUNTER — PATIENT MESSAGE (OUTPATIENT)
Age: 69
End: 2024-12-17

## 2025-01-17 DIAGNOSIS — G47.00 INSOMNIA, UNSPECIFIED TYPE: ICD-10-CM

## 2025-01-17 RX ORDER — ZOLPIDEM TARTRATE 10 MG/1
10 TABLET ORAL NIGHTLY
Qty: 90 TABLET | Refills: 0 | Status: SHIPPED | OUTPATIENT
Start: 2025-01-17 | End: 2025-04-17

## 2025-01-17 NOTE — TELEPHONE ENCOUNTER
April Brooks is calling to request a refill on the following medication(s):    Medication Request:  Requested Prescriptions     Pending Prescriptions Disp Refills    zolpidem (AMBIEN) 10 MG tablet 90 tablet 0     Sig: Take 1 tablet by mouth at bedtime for 90 days. Max Daily Amount: 10 mg       Last Visit Date (If Applicable):  10/21/2024    Next Visit Date:    4/21/2025

## 2025-02-11 DIAGNOSIS — E78.2 MIXED HYPERLIPIDEMIA: Primary | ICD-10-CM

## 2025-02-11 RX ORDER — ROSUVASTATIN CALCIUM 10 MG/1
10 TABLET, COATED ORAL DAILY
Qty: 90 TABLET | Refills: 3 | Status: SHIPPED | OUTPATIENT
Start: 2025-02-11

## 2025-02-11 NOTE — TELEPHONE ENCOUNTER
April Brooks is calling to request a refill on the following medication(s):    Medication Request:  Requested Prescriptions     Pending Prescriptions Disp Refills    rosuvastatin (CRESTOR) 10 MG tablet 90 tablet 3     Sig: Take 1 tablet by mouth daily       Last Visit Date (If Applicable):  10/21/2024    Next Visit Date:    4/21/2025

## 2025-04-15 DIAGNOSIS — G47.00 INSOMNIA, UNSPECIFIED TYPE: ICD-10-CM

## 2025-04-15 RX ORDER — ZOLPIDEM TARTRATE 10 MG/1
10 TABLET ORAL NIGHTLY
Qty: 90 TABLET | Refills: 0 | Status: SHIPPED | OUTPATIENT
Start: 2025-04-15 | End: 2025-07-14

## 2025-04-18 SDOH — ECONOMIC STABILITY: FOOD INSECURITY: WITHIN THE PAST 12 MONTHS, YOU WORRIED THAT YOUR FOOD WOULD RUN OUT BEFORE YOU GOT MONEY TO BUY MORE.: NEVER TRUE

## 2025-04-18 SDOH — ECONOMIC STABILITY: FOOD INSECURITY: WITHIN THE PAST 12 MONTHS, THE FOOD YOU BOUGHT JUST DIDN'T LAST AND YOU DIDN'T HAVE MONEY TO GET MORE.: NEVER TRUE

## 2025-04-18 SDOH — ECONOMIC STABILITY: INCOME INSECURITY: IN THE LAST 12 MONTHS, WAS THERE A TIME WHEN YOU WERE NOT ABLE TO PAY THE MORTGAGE OR RENT ON TIME?: NO

## 2025-04-18 SDOH — ECONOMIC STABILITY: TRANSPORTATION INSECURITY
IN THE PAST 12 MONTHS, HAS THE LACK OF TRANSPORTATION KEPT YOU FROM MEDICAL APPOINTMENTS OR FROM GETTING MEDICATIONS?: NO

## 2025-04-18 NOTE — PATIENT INSTRUCTIONS
April    Thank you for choosing [facility].  We know you have options when it comes to your healthcare; we appreciate that you chose us. Our goal is to provide exceptional  service and world class care to every patient.  You will be receiving a survey via email or text message asking for your feedback.  Please take a few minutes to share your thoughts about your recent visit. Your comments help us understand what we do well and ways we can improve.  Thank you in advance for your valuable feedback.      Dr. Estiven Polanco MA

## 2025-04-21 ENCOUNTER — OFFICE VISIT (OUTPATIENT)
Age: 70
End: 2025-04-21

## 2025-04-21 ENCOUNTER — HOSPITAL ENCOUNTER (OUTPATIENT)
Age: 70
Setting detail: SPECIMEN
Discharge: HOME | End: 2025-04-21

## 2025-04-21 VITALS
WEIGHT: 149.8 LBS | BODY MASS INDEX: 26.54 KG/M2 | SYSTOLIC BLOOD PRESSURE: 126 MMHG | DIASTOLIC BLOOD PRESSURE: 80 MMHG | OXYGEN SATURATION: 95 % | HEART RATE: 88 BPM | HEIGHT: 63 IN | TEMPERATURE: 98.8 F

## 2025-04-21 DIAGNOSIS — E78.2 MIXED HYPERLIPIDEMIA: ICD-10-CM

## 2025-04-21 DIAGNOSIS — G47.00 INSOMNIA, UNSPECIFIED TYPE: ICD-10-CM

## 2025-04-21 DIAGNOSIS — E78.2 MIXED HYPERLIPIDEMIA: Primary | ICD-10-CM

## 2025-04-21 DIAGNOSIS — E55.9 VITAMIN D DEFICIENCY: ICD-10-CM

## 2025-04-21 DIAGNOSIS — M79.7 FIBROMYALGIA: ICD-10-CM

## 2025-04-21 DIAGNOSIS — Z78.0 POSTMENOPAUSAL: ICD-10-CM

## 2025-04-21 DIAGNOSIS — M15.0 PRIMARY OSTEOARTHRITIS INVOLVING MULTIPLE JOINTS: ICD-10-CM

## 2025-04-21 DIAGNOSIS — E03.9 HYPOTHYROIDISM (ACQUIRED): ICD-10-CM

## 2025-04-21 DIAGNOSIS — Z12.31 SCREENING MAMMOGRAM FOR BREAST CANCER: ICD-10-CM

## 2025-04-21 DIAGNOSIS — K90.0 CELIAC DISEASE: ICD-10-CM

## 2025-04-21 LAB
25(OH)D3 SERPL-MCNC: 46.9 NG/ML (ref 30–100)
ALBUMIN SERPL-MCNC: 4.3 G/DL (ref 3.5–5.2)
ALBUMIN/GLOB SERPL: 1.7 {RATIO} (ref 1–2.5)
ALP SERPL-CCNC: 60 U/L (ref 35–104)
ALT SERPL-CCNC: 28 U/L (ref 10–35)
ANION GAP SERPL CALCULATED.3IONS-SCNC: 9 MMOL/L (ref 9–16)
AST SERPL-CCNC: 28 U/L (ref 10–35)
BASOPHILS # BLD: 0.09 K/UL (ref 0–0.2)
BASOPHILS NFR BLD: 1 % (ref 0–2)
BILIRUB SERPL-MCNC: 0.3 MG/DL (ref 0–1.2)
BUN SERPL-MCNC: 19 MG/DL (ref 8–23)
CALCIUM SERPL-MCNC: 10.3 MG/DL (ref 8.6–10.4)
CHLORIDE SERPL-SCNC: 105 MMOL/L (ref 98–107)
CHOLEST SERPL-MCNC: 174 MG/DL (ref 0–199)
CHOLESTEROL/HDL RATIO: 2.9
CO2 SERPL-SCNC: 29 MMOL/L (ref 20–31)
CREAT SERPL-MCNC: 1 MG/DL (ref 0.6–0.9)
EOSINOPHIL # BLD: 0.14 K/UL (ref 0–0.44)
EOSINOPHILS RELATIVE PERCENT: 2 % (ref 1–4)
ERYTHROCYTE [DISTWIDTH] IN BLOOD BY AUTOMATED COUNT: 13.7 % (ref 11.8–14.4)
GFR, ESTIMATED: 60 ML/MIN/1.73M2
GLUCOSE SERPL-MCNC: 88 MG/DL (ref 74–99)
HCT VFR BLD AUTO: 39.7 % (ref 36.3–47.1)
HDLC SERPL-MCNC: 59 MG/DL
HGB BLD-MCNC: 12.7 G/DL (ref 11.9–15.1)
IMM GRANULOCYTES # BLD AUTO: 0.03 K/UL (ref 0–0.3)
IMM GRANULOCYTES NFR BLD: 0 %
LDLC SERPL CALC-MCNC: 103 MG/DL (ref 0–100)
LYMPHOCYTES NFR BLD: 1.48 K/UL (ref 1.1–3.7)
LYMPHOCYTES RELATIVE PERCENT: 18 % (ref 24–43)
MCH RBC QN AUTO: 30.3 PG (ref 25.2–33.5)
MCHC RBC AUTO-ENTMCNC: 32 G/DL (ref 28.4–34.8)
MCV RBC AUTO: 94.7 FL (ref 82.6–102.9)
MONOCYTES NFR BLD: 0.74 K/UL (ref 0.1–1.2)
MONOCYTES NFR BLD: 9 % (ref 3–12)
NEUTROPHILS NFR BLD: 70 % (ref 36–65)
NEUTS SEG NFR BLD: 5.66 K/UL (ref 1.5–8.1)
NRBC BLD-RTO: 0 PER 100 WBC
PLATELET # BLD AUTO: 297 K/UL (ref 138–453)
PMV BLD AUTO: 11.3 FL (ref 8.1–13.5)
POTASSIUM SERPL-SCNC: 4.4 MMOL/L (ref 3.7–5.3)
PROT SERPL-MCNC: 6.8 G/DL (ref 6.6–8.7)
RBC # BLD AUTO: 4.19 M/UL (ref 3.95–5.11)
SODIUM SERPL-SCNC: 143 MMOL/L (ref 136–145)
T4 FREE SERPL-MCNC: 1.5 NG/DL (ref 0.92–1.68)
TRIGL SERPL-MCNC: 58 MG/DL
TSH SERPL DL<=0.05 MIU/L-ACNC: 0.41 UIU/ML (ref 0.27–4.2)
VLDLC SERPL CALC-MCNC: 12 MG/DL (ref 1–30)
WBC OTHER # BLD: 8.1 K/UL (ref 3.5–11.3)

## 2025-04-21 RX ORDER — ZOLPIDEM TARTRATE 12.5 MG/1
12.5 TABLET, FILM COATED, EXTENDED RELEASE ORAL NIGHTLY PRN
Qty: 30 TABLET | Refills: 0 | Status: SHIPPED | OUTPATIENT
Start: 2025-04-21 | End: 2025-07-20

## 2025-04-21 RX ORDER — ZOLPIDEM TARTRATE 10 MG/1
10 TABLET ORAL NIGHTLY
Qty: 90 TABLET | Refills: 0 | Status: CANCELLED | OUTPATIENT
Start: 2025-04-21 | End: 2025-07-20

## 2025-04-21 ASSESSMENT — PATIENT HEALTH QUESTIONNAIRE - PHQ9
1. LITTLE INTEREST OR PLEASURE IN DOING THINGS: NOT AT ALL
SUM OF ALL RESPONSES TO PHQ QUESTIONS 1-9: 0
2. FEELING DOWN, DEPRESSED OR HOPELESS: NOT AT ALL

## 2025-04-21 NOTE — PROGRESS NOTES
[facility]  [facility] MEDICINE  [address]  [place] [address]  Dept: [phone]     Date of Visit:  2025  Patient Name: April Brooks   Patient :  1955     CHIEF COMPLAINT/HPI:     Chief Complaint   Patient presents with    Health Maintenance     Patient is here for a routine check up         HPI      April Brooks, 69 y.o. presents today for a follow up of hyperlipidemia, hypothyroidism, osteoarthritis, fibromyalgia, celiac disease, and insomnia. She has been well. At her last appointment, she complained of increased urination frequency, she has made lifestyle changes and this has reduced. She usually drinks decaf ice tea with lemonade but has decreased this amount with improvement. She has decreased the amount she consumes coffee. She usually noticed increased urination when she is traveling but she has not travelled recently. She denies urinary issues currently. She denies incontinence and urgency. She notes she will be traveling on a cruise at the end of May. She notes increased stress recently because she is building a new home and moving.  She didn't get in to a counselor yet but her daughter is in rehab so things have been better. She does have the  names if needed.    Her takes diclofenac 75mg daily for her joint pain. She does not take a medication to protect her stomach lining and currently declines. She denies gastric pain and belching.     Her energy levels remain stable. She takes Synthroid 100mcg daily.     She reports Celiac disease so she intermittently experiences diarrhea.     Her takes Ambien nightly and continues to experience difficulty sleeping. She usually gets 4-5 hours of sleep nightly. She has taken Ambien controlled release in the past but it stopped being covered by her insurance.     She is due to see her gynecologist but wonders if she still needs to see her anymore. She denies gynecological issues. Her current GYN

## 2025-04-22 ENCOUNTER — RESULTS FOLLOW-UP (OUTPATIENT)
Age: 70
End: 2025-04-22

## 2025-04-22 DIAGNOSIS — E78.2 MIXED HYPERLIPIDEMIA: Primary | ICD-10-CM

## 2025-05-02 ENCOUNTER — PATIENT MESSAGE (OUTPATIENT)
Age: 70
End: 2025-05-02

## 2025-05-12 RX ORDER — LEVOTHYROXINE SODIUM 100 UG/1
100 TABLET ORAL DAILY
Qty: 90 TABLET | Refills: 1 | Status: SHIPPED | OUTPATIENT
Start: 2025-05-12

## 2025-05-12 NOTE — TELEPHONE ENCOUNTER
April Brooks is calling to request a refill on the following medication(s):    Medication Request:  Requested Prescriptions     Pending Prescriptions Disp Refills    levothyroxine (SYNTHROID) 100 MCG tablet 90 tablet 1     Sig: Take 1 tablet by mouth daily       Last Visit Date (If Applicable):  4/21/2025    Next Visit Date:    10/23/2025

## 2025-05-14 DIAGNOSIS — F41.9 ANXIETY: Primary | ICD-10-CM

## 2025-05-14 RX ORDER — ALPRAZOLAM 0.25 MG
0.25 TABLET ORAL NIGHTLY PRN
Qty: 30 TABLET | Refills: 0 | Status: SHIPPED | OUTPATIENT
Start: 2025-05-14 | End: 2025-06-13

## 2025-05-14 NOTE — TELEPHONE ENCOUNTER
April Brooks is calling to request a refill on the following medication(s):    Medication Request:  Requested Prescriptions     Pending Prescriptions Disp Refills    ALPRAZolam (XANAX) 0.25 MG tablet 30 tablet 0     Sig: Take 1 tablet by mouth nightly as needed for Anxiety for up to 30 days. Max Daily Amount: 0.25 mg       Last Visit Date (If Applicable):  4/21/2025    Next Visit Date:    10/23/2025

## 2025-06-16 ENCOUNTER — TELEPHONE (OUTPATIENT)
Age: 70
End: 2025-06-16

## 2025-06-16 DIAGNOSIS — R30.0 DYSURIA: Primary | ICD-10-CM

## 2025-06-16 NOTE — TELEPHONE ENCOUNTER
Patient is calling a while back she was having frequency and the UA was negative but the culture showed positive. Patient is having frequency again is wondering is she can get another UA and culture

## 2025-06-17 ENCOUNTER — HOSPITAL ENCOUNTER (OUTPATIENT)
Age: 70
Setting detail: SPECIMEN
Discharge: HOME | End: 2025-06-17

## 2025-06-17 DIAGNOSIS — R30.0 DYSURIA: ICD-10-CM

## 2025-06-17 LAB
BACTERIA URNS QL MICRO: ABNORMAL
BILIRUB UR QL STRIP: NEGATIVE
CASTS #/AREA URNS LPF: ABNORMAL /LPF (ref 0–8)
CLARITY UR: CLEAR
COLOR UR: YELLOW
EPI CELLS #/AREA URNS HPF: ABNORMAL /HPF (ref 0–5)
GLUCOSE UR STRIP-MCNC: NEGATIVE MG/DL
HGB UR QL STRIP.AUTO: NEGATIVE
KETONES UR STRIP-MCNC: NEGATIVE MG/DL
LEUKOCYTE ESTERASE UR QL STRIP: ABNORMAL
NITRITE UR QL STRIP: NEGATIVE
PH UR STRIP: 5.5 [PH] (ref 5–8)
PROT UR STRIP-MCNC: NEGATIVE MG/DL
RBC #/AREA URNS HPF: ABNORMAL /HPF (ref 0–4)
SP GR UR STRIP: 1.01 (ref 1–1.03)
UROBILINOGEN UR STRIP-ACNC: NORMAL EU/DL (ref 0–1)
WBC #/AREA URNS HPF: ABNORMAL /HPF (ref 0–5)

## 2025-06-18 LAB
MICROORGANISM SPEC CULT: ABNORMAL
SERVICE CMNT-IMP: ABNORMAL
SPECIMEN DESCRIPTION: ABNORMAL

## 2025-06-24 ENCOUNTER — RESULTS FOLLOW-UP (OUTPATIENT)
Age: 70
End: 2025-06-24

## 2025-06-24 DIAGNOSIS — Z78.0 POSTMENOPAUSAL: ICD-10-CM

## 2025-06-30 ENCOUNTER — TELEPHONE (OUTPATIENT)
Age: 70
End: 2025-06-30

## 2025-06-30 RX ORDER — CEPHALEXIN 500 MG/1
500 CAPSULE ORAL 3 TIMES DAILY
Qty: 21 CAPSULE | Refills: 0 | Status: SHIPPED | OUTPATIENT
Start: 2025-06-30

## 2025-06-30 NOTE — TELEPHONE ENCOUNTER
Patient calling because she has been off antibiotic keflex for 5 days she is still experiencing symptoms of urgency and frequency but no other symptoms. Patient wondering if Dr. JACKSON thinks she should get another UA and culture or if another round of antibiotic should be sent to the [facility] in [place].

## 2025-06-30 NOTE — TELEPHONE ENCOUNTER
The culture was sensitive to keflex- so I sent another course.  If not better after this round then will repeat UA CS

## 2025-08-13 DIAGNOSIS — G47.00 INSOMNIA, UNSPECIFIED TYPE: ICD-10-CM

## 2025-08-13 RX ORDER — ZOLPIDEM TARTRATE 10 MG/1
TABLET ORAL
Qty: 90 TABLET | Refills: 0 | Status: SHIPPED | OUTPATIENT
Start: 2025-08-13 | End: 2025-11-11